# Patient Record
Sex: FEMALE | Race: WHITE
[De-identification: names, ages, dates, MRNs, and addresses within clinical notes are randomized per-mention and may not be internally consistent; named-entity substitution may affect disease eponyms.]

---

## 2018-06-07 ENCOUNTER — HOSPITAL ENCOUNTER (EMERGENCY)
Dept: HOSPITAL 26 - MED | Age: 33
Discharge: HOME | End: 2018-06-07
Payer: COMMERCIAL

## 2018-06-07 VITALS — HEIGHT: 66 IN | WEIGHT: 235 LBS | BODY MASS INDEX: 37.77 KG/M2

## 2018-06-07 VITALS — SYSTOLIC BLOOD PRESSURE: 153 MMHG | DIASTOLIC BLOOD PRESSURE: 73 MMHG

## 2018-06-07 VITALS — DIASTOLIC BLOOD PRESSURE: 73 MMHG | SYSTOLIC BLOOD PRESSURE: 153 MMHG

## 2018-06-07 DIAGNOSIS — E11.9: ICD-10-CM

## 2018-06-07 DIAGNOSIS — M54.5: Primary | ICD-10-CM

## 2018-06-07 NOTE — NUR
Patient discharged with v/s stable. Written and verbal after care instructions 
given and explained. 

Patient alert, oriented and verbalized understanding of instructions. 
Ambulatory with steady gait. All questions addressed prior to discharge. ID 
band removed. Patient advised to follow up with PMD. Rx of MOTRIN AND FLEXERIL 
given. Patient educated on indication of medication including possible reaction 
and side effects. Opportunity to ask questions provided and answered.

## 2018-06-07 NOTE — NUR
32F BIBA WITH C/O BL LOWE BACK S/P INJURY TODAY. PATIENT STATES ANOTHER GUEST 
FROM HOME WAS PLAYING AROUND BY CLIMBING HER BACK. PATIENT STATES SHE HEARD A 
POP. PATIENT REPORTS OF 10/10 "SHARP" CONSTANT BL LOWER BACK PAIN. PT ABLE TO 
ABLE ALL FOUR EXTREMITIES. PT DENIES ANY NUMBNESS/TINGLING TO BL LOWER 
EXTREMITIES. NO URINE OR BOWEL INCONTINENCE NOTED. PT IS AOX4. GCS=15. RR ARE 
EVEN AND UNLABORED. VSS. AWAITING ER MD CASH. PATIENT PROVIDED WITH WARM 
BLANKET. WILL CONTINUE TO MONITOR.

## 2019-01-02 ENCOUNTER — HOSPITAL ENCOUNTER (EMERGENCY)
Dept: HOSPITAL 1 - ED | Age: 34
Discharge: HOME | End: 2019-01-02
Payer: COMMERCIAL

## 2019-01-02 VITALS
BODY MASS INDEX: 27.97 KG/M2 | WEIGHT: 152 LBS | HEIGHT: 62 IN | WEIGHT: 152 LBS | HEIGHT: 62 IN | BODY MASS INDEX: 27.97 KG/M2

## 2019-01-02 VITALS — DIASTOLIC BLOOD PRESSURE: 90 MMHG | SYSTOLIC BLOOD PRESSURE: 131 MMHG

## 2019-01-02 DIAGNOSIS — M94.0: Primary | ICD-10-CM

## 2019-01-07 ENCOUNTER — HOSPITAL ENCOUNTER (EMERGENCY)
Dept: HOSPITAL 1 - ED | Age: 34
Discharge: HOME | End: 2019-01-07
Payer: COMMERCIAL

## 2019-01-07 VITALS
WEIGHT: 146.5 LBS | HEIGHT: 62 IN | WEIGHT: 146.5 LBS | HEIGHT: 62 IN | BODY MASS INDEX: 26.96 KG/M2 | BODY MASS INDEX: 26.96 KG/M2

## 2019-01-07 VITALS — DIASTOLIC BLOOD PRESSURE: 95 MMHG | SYSTOLIC BLOOD PRESSURE: 149 MMHG

## 2019-01-07 DIAGNOSIS — Z98.890: ICD-10-CM
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
## 2019-01-07 DIAGNOSIS — S09.8XXA: Primary | ICD-10-CM
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
## 2019-01-07 DIAGNOSIS — Z90.711: ICD-10-CM

## 2019-01-07 DIAGNOSIS — M25.519: ICD-10-CM

## 2020-09-22 ENCOUNTER — HOSPITAL ENCOUNTER (EMERGENCY)
Dept: HOSPITAL 26 - MED | Age: 35
Discharge: HOME | End: 2020-09-22
Payer: COMMERCIAL

## 2020-09-22 VITALS — BODY MASS INDEX: 33.32 KG/M2 | HEIGHT: 65 IN | WEIGHT: 200 LBS

## 2020-09-22 VITALS — DIASTOLIC BLOOD PRESSURE: 75 MMHG | SYSTOLIC BLOOD PRESSURE: 137 MMHG

## 2020-09-22 DIAGNOSIS — Z79.84: ICD-10-CM

## 2020-09-22 DIAGNOSIS — F20.9: ICD-10-CM
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
## 2020-09-22 DIAGNOSIS — E11.9: ICD-10-CM
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
## 2020-09-22 DIAGNOSIS — Z79.899: ICD-10-CM

## 2020-09-22 DIAGNOSIS — F17.200: ICD-10-CM

## 2020-09-22 DIAGNOSIS — S82.832A: Primary | ICD-10-CM

## 2020-09-22 NOTE — NUR
Patient discharged with v/s stable. Written and verbal after care instructions 
given and explained. 

Patient alert, oriented and verbalized understanding of instructions. 
Ambulatory with steady gait. All questions addressed prior to discharge. ID 
band removed. Patient advised to follow up with PMD. Rx of Naprosyn given. 
Patient educated on indication of medication including possible reaction and 
side effects. Opportunity to ask questions provided and answered.

Pt informed to follow up with orthopedic surgeon

## 2020-09-22 NOTE — NUR
bib from [facility] c /o left ankle pain, swollen s/p twisted & fall 
x yesterday.

 med hx: schizophrenia, obesity 1.64

## 2020-09-22 NOTE — NUR
+cms, skin warm, dry, and intact after application of splint. Pt verbalizes and 
return demonstrates use of crutches